# Patient Record
Sex: FEMALE | Race: OTHER
[De-identification: names, ages, dates, MRNs, and addresses within clinical notes are randomized per-mention and may not be internally consistent; named-entity substitution may affect disease eponyms.]

---

## 2022-01-20 PROBLEM — Z00.00 ENCOUNTER FOR PREVENTIVE HEALTH EXAMINATION: Status: ACTIVE | Noted: 2022-01-20

## 2022-02-07 ENCOUNTER — NON-APPOINTMENT (OUTPATIENT)
Age: 67
End: 2022-02-07

## 2022-02-15 ENCOUNTER — APPOINTMENT (OUTPATIENT)
Dept: BREAST CENTER | Facility: CLINIC | Age: 67
End: 2022-02-15
Payer: COMMERCIAL

## 2022-02-15 ENCOUNTER — NON-APPOINTMENT (OUTPATIENT)
Age: 67
End: 2022-02-15

## 2022-02-15 VITALS
HEIGHT: 62 IN | HEART RATE: 70 BPM | DIASTOLIC BLOOD PRESSURE: 92 MMHG | WEIGHT: 165 LBS | OXYGEN SATURATION: 98 % | BODY MASS INDEX: 30.36 KG/M2 | SYSTOLIC BLOOD PRESSURE: 172 MMHG

## 2022-02-15 DIAGNOSIS — Z78.9 OTHER SPECIFIED HEALTH STATUS: ICD-10-CM

## 2022-02-15 DIAGNOSIS — Z12.39 ENCOUNTER FOR OTHER SCREENING FOR MALIGNANT NEOPLASM OF BREAST: ICD-10-CM

## 2022-02-15 PROCEDURE — 99213 OFFICE O/P EST LOW 20 MIN: CPT

## 2022-02-15 NOTE — PHYSICAL EXAM
[Normocephalic] : normocephalic [EOMI] : extra ocular movement intact [Supple] : supple [No Supraclavicular Adenopathy] : no supraclavicular adenopathy [No Cervical Adenopathy] : no cervical adenopathy [Examined in the supine and seated position] : examined in the supine and seated position [Symmetrical] : symmetrical [No dominant masses] : no dominant masses in right breast  [No dominant masses] : no dominant masses left breast [No Nipple Retraction] : no left nipple retraction [No Nipple Discharge] : no left nipple discharge [No Axillary Lymphadenopathy] : no left axillary lymphadenopathy [No Rashes] : no rashes [No Ulceration] : no ulceration

## 2022-02-21 NOTE — REVIEW OF SYSTEMS
[As Noted in HPI] : as noted in HPI [Negative] : Constitutional [Fever] : no fever [Chills] : no chills [Shortness Of Breath] : no shortness of breath [Cough] : no cough [Skin Lesions] : no skin lesions [Skin Wound] : no skin wound

## 2022-02-21 NOTE — ASSESSMENT
[FreeTextEntry1] : 66 year old female presented for annual breast cancer screening, was previously followed for right breast fibroadenoma in 2012 and family history of breast cancer in paternal third cousin at the age of 70. Physical exam WNL. Patient refuses to undergo mammograms for screening due to radiation. Patient had thermography performed in January 2020. Discussed importance of mammograms and lack of proven efficacy or FDA approval of thermograms. Patient to consider mammography every 1-2 years, plan for screening MMG/US in 1 year (February 2023). \par

## 2022-02-21 NOTE — DATA REVIEWED
[FreeTextEntry1] : 2/20/2020 (R) bilateral breast US showing No suspicious solid or complex cystic mass is detected in either breast. There is no pathological lymphadenopathy in either axilla. IMPRESSION: No ultrasonographic evidence of malignancy. FOLLOW-UP: Annual screening. ASSESSMENT: BI-RADS Category 1\par \par 1/28/2022: ([facility]) thermography showing mild to moderate risk of developing aggressive tissue. Comparative study recommended annually confirming continued tissue physicological stability/vulnerability. \par \par 2/15/2022 ([facility]) bilateral US showing No suspicious solid or complex cystic mass is detected in either breast. There is no lymphadenopathy in the axillae.  IMPRESSION: No ultrasonographic evidence of malignancy. FOLLOW-UP: Annual screening. ASSESSMENT: BI-RADS Category 1

## 2022-02-21 NOTE — HISTORY OF PRESENT ILLNESS
[FreeTextEntry1] : 66 year old female previously under the care of Dr. Baudilio Gutierrez, last evaluated 2/2020 presents for breast cancer screening, was previously followed for right breast fibroadenoma in 2012 and family history of breast cancer in paternal third cousin at the age of 70. Of note, patient refuses to undergo mammograms for screening due to radiation. Patient had thermography performed in January 2020. \par \par JOSH lifetime risk of 8.8%\par \par \par Family Hx:\par father, prostate cancer, 80\par paternal third cousin, breast cancer, 70